# Patient Record
Sex: FEMALE | Race: WHITE
[De-identification: names, ages, dates, MRNs, and addresses within clinical notes are randomized per-mention and may not be internally consistent; named-entity substitution may affect disease eponyms.]

---

## 2019-08-28 ENCOUNTER — HOSPITAL ENCOUNTER (INPATIENT)
Dept: HOSPITAL 35 - SBH | Age: 79
LOS: 6 days | Discharge: HOME | DRG: 988 | End: 2019-09-03
Attending: PSYCHIATRY & NEUROLOGY | Admitting: PSYCHIATRY & NEUROLOGY
Payer: COMMERCIAL

## 2019-08-28 VITALS — SYSTOLIC BLOOD PRESSURE: 170 MMHG | DIASTOLIC BLOOD PRESSURE: 60 MMHG

## 2019-08-28 VITALS — SYSTOLIC BLOOD PRESSURE: 182 MMHG | DIASTOLIC BLOOD PRESSURE: 68 MMHG

## 2019-08-28 DIAGNOSIS — Z79.4: ICD-10-CM

## 2019-08-28 DIAGNOSIS — F02.81: ICD-10-CM

## 2019-08-28 DIAGNOSIS — F32.9: ICD-10-CM

## 2019-08-28 DIAGNOSIS — G30.9: Primary | ICD-10-CM

## 2019-08-28 DIAGNOSIS — I10: ICD-10-CM

## 2019-08-28 DIAGNOSIS — E11.9: ICD-10-CM

## 2019-08-28 DIAGNOSIS — Z91.5: ICD-10-CM

## 2019-08-28 DIAGNOSIS — N90.89: ICD-10-CM

## 2019-08-28 DIAGNOSIS — F19.10: ICD-10-CM

## 2019-08-28 DIAGNOSIS — F41.9: ICD-10-CM

## 2019-08-28 DIAGNOSIS — Z79.899: ICD-10-CM

## 2019-08-28 PROCEDURE — 10880: CPT

## 2019-08-28 NOTE — NUR
Admitted from [place] arrived by EMS at 1630, to room 524 B, it was reported
she would be a 96 hour hold, but she is calm and cooperative now, and signed
herself in voluntarily, so was not placed on hold by Dr. Hardwick, she is
alert and oriented x 4, very anxious and nearly hyperventilating, reassured
and had her take deep breaths and to speak with me, she did calm down and was
able to finish the interview. She stated she is diabetic and takes insulin
depending on her "sugar level" I did find other meds in her belonging
including antidepressants and antihypertensives, when asked she said "oh yes,
those aren't as important as my insulin", she stated today she was frustrated
and mad, because her son would not let her go to the [facility] to visit her
 , that proposed to her on this day. She said she made a
statement that she "just wanted to be dead with her " she stated she
didn't mean it she was just made and started walking to the [facility], when EMS
picked her up and took her to [place], she was very angry at [place], but is now
calm and cooperative. Dr. Hardwick here and visited, orders received and will
monitor for safety.

## 2019-08-28 NOTE — NUR
RECEIVED REPORT FROM OFFGOING DAY NURSE, ASSUMED CARE OF PATIENT @ 19:15.
SITTING IN COMMON ROOM SOCIALIZING WITH PEERS AND WATCHING BASEBALL ON TV.
REPORTS SON GOT OUT OF [facility] AND HE AND HIS GIRLFRIEND ARE STAYING WITH PATIENT
AT HER HOUSE.  BECAME TEARFUL WHEN REPORTING THAT SHE HOPED SON WOULD TAKE
CARE OF HER GARDEN WHILE SHE WAS IN THE HOSPITAL.  A&OX 3-4.  HRRR, LUNGS CTA,
ABD NORMOACTIVE.  PT SMELLS UNCLEAN, WHEN ASKED WHEN SHE LAST BATHED, SHE
BECAME TEARFUL AND DID NOT ANSWER.  FSBS 187, LISPRO 3 U S/S PROVIDED. TOOK PO
MEDS WHOLE WITH WATER.  WILL CONINTIUE TO MONITOR Q 12 MINUTES FOR PATIENT
SAFETY.

## 2019-08-28 NOTE — D
[facility]
[address]
[place]   [address]                     DISCHARGE SUMMARY             
_______________________________________________________________________________
 
Name:       YOLA BAKER      Room #:         524B-B      DIS IN  
M.R.#:      [id number]                       Account #:      [id number]  
Admission:  08/28/19    Attend Phys:    Nikunj Hardwick DO
Discharge:  09/03/19    Date of Birth:  [birth year]  
                                                          Report #: [id number]
                                                                    [id number]   
_______________________________________________________________________________
THIS REPORT FOR:   //name//                          
 
CC: Nikunj Hardwick
    FAM physician/PCP
 
DATE OF SERVICE:  09/03/2019
 
 
ATTENDING PHYSICIAN:  Nikunj Hardwick DO
 
MEDICAL CONSULTANTS:  Annetta Del Toro M.D., also consulting on this case is
Zain Shi M.D., Gynecology.
 
DISCHARGE DIAGNOSES:  Major neurocognitive disorder, likely due to Alzheimer's
disease with behavioral disturbance, improved.
 
ADDITIONAL DIAGNOSES:  Vulvar mass, highly suspicious for carcinoma; however,
histopathology is pending.  Other diagnoses are diabetes mellitus, on metformin,
sliding scale insulin; hypertension, on Coreg, Norvasc, lisinopril.  The patient
was suspicious, though did not admit to smoking tobacco.  EKG was done at time
of admission showed a QTC of 424, ventricular rate of 60, sinus rhythm.  Also
noted microbiology done, gram-positive cocci and many gram-negative rods,
aerobic and anaerobic cultures pending as is the histopathology as punch biopsy
was taken of the vulvar mass.  It should be noted greater than 45 minutes was
spent on discharge activity.
 
DISCHARGE MEDICATIONS:  Coreg 3.125 mg p.o. b.i.d. with meals, Depakote 
mg p.o. b.i.d., calcium carbonate 500 mg, ____ cyanocobalamin 1000 mcg oral
supplementation, cholecalciferol 5000 international units p.o. daily, melatonin
10 mg p.o. at bedtime, metformin 500 mg p.o. b.i.d. with meals, ____ 10 mg p.o.
daily for hypertension, lisinopril 2.5 mg p.o. daily for hypertension, aspirin
81 mg p.o. daily for cardioprotection and NPH home regimen daily p.r.n. for
diabetes mellitus.  This admission, we stopped her Aricept given for bradycardia
and the degree of her dementia.
 
[facility]  She will need to
be taken there for an intake.  Primary care follow up should be with the PCP. 
Her DPOA wanted her to leave today before final pathology has resulted. 
Discussed with Dr. Shi over the phone the next steps for the mass if it turns
to be cancer.  The patient will need a gynecological evaluation.  Dr. Shi
refers to ____ [facility] for that.
 
REASON FOR ADMISSION:  A 78-year-old female brought to the [facility] on a
96-hour hold from [facility].  Apparently, the patient was doing some
threatening things and refused to go to the hospital.
 
 
 
 
[address]   [address]                     DISCHARGE SUMMARY             
_______________________________________________________________________________
 
Name:       YOLA BAKER      Room #:         524B-B      DIS IN  
M.R.#:      [id number]                       Account #:      [id number]  
Admission:  08/28/19    Attend Phys:    Nikunj Hardwick DO
Discharge:  09/03/19    Date of Birth:  [birth year]  
                                                          Report #: [id number]
                                                                    [id number]   
_______________________________________________________________________________
HOSPITAL COURSE:  The patient was admitted to the Geriatric Psychiatry Unit. 
The patient scored ____ exact score, but I think it was in the arena of 6-8/30. 
We had a couple of family meetings ____ girlfriend the need for 24/7 supervision
and assistance was emphasized.  The sons want to take the patient kind of in a
shared fashion to the son that lives in [place] ____ and then the son who lives
near [facility].
 
LABORATORY DATA:  Significant lab work this admission; urinalysis showed 2+
protein, few amorphous urates, trace random glucose, blood sugars the day before
admission were in the 128-203 range, which is excellent control.  Syphilis
serology turned out negative.
 
IMAGING:  The patient had a head CT done on 08/30/2019, it was essentially
normal.  Surprisingly, no ____.
 
OBJECTIVE:
VITAL SIGNS:  Temperature 36.6, pulse 81, respirations 40, /71, O2 sat
96%.
GENERAL:  This is a well-developed, fairly nourished  female appearing
stated age.  Attention limited, concentration unknown.  Speech is normal rate. 
Thought process linear and goal directed.  Thought content is focused.  On her
day-to-day needs ____.  Memory not formally tested.  Insight limited.  Judgment
limited.  Fund of knowledge below average.  It should be noted family meeting
was had at the day of discharge.  I have asked the sons to keep her few more
days.  They want her to come home today.  Dr. Shi was okay with discharge from
the gynecologic standpoint.  Diagnosis of dementia was made of the patient as
well as brief review of the circumstances surrounding her ____ pathology.
 
Prognosis is poor.
 
 
 
 
 
 
 
 
 
 
 
 
 
 
 
                         
   By:                               
                   
D: 09/04/19 0025                           _____________________________________
T: 09/04/19 0156                           Nikunj Hardwick, DO          /nt

## 2019-08-29 VITALS — DIASTOLIC BLOOD PRESSURE: 67 MMHG | SYSTOLIC BLOOD PRESSURE: 170 MMHG

## 2019-08-29 VITALS — DIASTOLIC BLOOD PRESSURE: 60 MMHG | SYSTOLIC BLOOD PRESSURE: 170 MMHG

## 2019-08-29 VITALS — DIASTOLIC BLOOD PRESSURE: 52 MMHG | SYSTOLIC BLOOD PRESSURE: 155 MMHG

## 2019-08-29 LAB
ANION GAP SERPL CALC-SCNC: 8 MMOL/L (ref 7–16)
BUN SERPL-MCNC: 25 MG/DL (ref 7–18)
CALCIUM SERPL-MCNC: 8.8 MG/DL (ref 8.5–10.1)
CHLORIDE SERPL-SCNC: 105 MMOL/L (ref 98–107)
CO2 SERPL-SCNC: 26 MMOL/L (ref 21–32)
CREAT SERPL-MCNC: 1.4 MG/DL (ref 0.6–1)
GLUCOSE SERPL-MCNC: 157 MG/DL (ref 74–106)
POTASSIUM SERPL-SCNC: 4.2 MMOL/L (ref 3.5–5.1)
SODIUM SERPL-SCNC: 139 MMOL/L (ref 136–145)

## 2019-08-29 NOTE — EKG
[address]  [address]
Phone:  [phone]                    ELECTROCARDIOGRAM REPORT      
_______________________________________________________________________________
 
Name:       YOLA BAKER      Room #:         [facility]      ADM IN  
M.R.#:      [id number]     Account #:      [id number]  
Admission:  19    Attend Phys:    Nikunj Hardwick DO
Discharge:              Date of Birth:  [birth year]  
                                                          Report #: [id number]
   [id number]
_______________________________________________________________________________
THIS REPORT FOR:   //name//                          
 
                          [facility]
                                       
Test Date:    2019               Test Time:    19:49:05
Pat Name:     YOLA BAKER        Department:   
Patient ID:   [id number]            Room:         [facility]
Gender:       F                        Technician:   LIANNE DELCID
:          1940               Requested By: Nikunj Hardwick
Order Number: [id number] MD:   Holden Low
                                 Measurements
Intervals                              Axis          
Rate:         60                       P:            -5
GA:           169                      QRS:          -8
QRSD:         78                       T:            56
QT:           424                                    
QTc:          424                                    
                           Interpretive Statements
Sinus rhythm
Ventricular premature complex
No previous ECG available for comparison
 
Electronically Signed On 2019 8:37:48 CDT by Holden Low
[email or web address]
 
 
 
 
 
 
 
 
 
 
 
 
 
 
 
 
 
 
 
  <ELECTRONICALLY SIGNED>
   By: Holden Low MD        
  19
D: 19                           _____________________________________
T: 19                           Holden Low MD          /TIFFANIE

## 2019-08-29 NOTE — NUR
NEW ORDER FOR VISTARIL 25 MG PO X 1 FOR ANXIETY, IF CANNOT GIVE PO, THEN GIVE
IM.  MELATONIN 10 MG FOR SLEEP.
PROZAC 10 MG RESUME @ DAILY.

## 2019-08-29 NOTE — NUR
Care assumed of patient at 1900: Patient alert and oriented x4.  Patient
pleasant and cooperative.  Patient took HS medication without difficulty.  Ate
100% HS snack.  Patient anxious and restless at times.  Patient was able to
sit and watch part of the football game with peers for awhile.  Patient
interacting well with staff.  Patient reports that she lost her  and
her father within a 2 week span.  Reports that she lost her oldest son about 4
weeks ago due to an MI.  Reports that [facility] too [facility].  Patient does appear depressed.  Denies
SI/HI/AH/VH at this time.  Reports that she was overwhelmed when she made the
SI statements before admission.  No s/s of paranoia or delusional behaviors at
this time.  Denies pain or discomfort.  Patient able to retire to bed and has
been able to sleep without difficulty at this time.
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
## 2019-08-30 VITALS — DIASTOLIC BLOOD PRESSURE: 59 MMHG | SYSTOLIC BLOOD PRESSURE: 151 MMHG

## 2019-08-30 VITALS — SYSTOLIC BLOOD PRESSURE: 141 MMHG | DIASTOLIC BLOOD PRESSURE: 58 MMHG

## 2019-08-30 VITALS — SYSTOLIC BLOOD PRESSURE: 133 MMHG | DIASTOLIC BLOOD PRESSURE: 104 MMHG

## 2019-08-30 LAB
EST. AVERAGE GLUCOSE BLD GHB EST-MCNC: 143 MG/DL
GLYCOHEMOGLOBIN (HGB A1C): 6.6 % (ref 4.8–5.6)

## 2019-08-30 NOTE — NUR
SW attempt to contact Tulio and Erwin to schedule a family meeting. CARLOZ
contacted the family on [phone]. CARLOZ was unable to leave voicemail due to
phone message been full. SW will have weekend SW contact family.

## 2019-08-30 NOTE — NUR
ASSUMED CARE AT 0700 THIS MORNING.  PT. UP  ON THE UNIT SITTING AND TALKING TO
PEERS.  SHE HAS BEEN QUIET AND SUBDUED TODAY.  SHE INTERACTS UPON APPROACH
ONLY.  SHE TOOK HER MEDS WITHOUT PROBLEMS.  SHE ATE ON THE UNIT, AND ATTENDED
GROUPS.  SHE IS MOVING FROM PERSON TO PERSON INTERACTING WITH SOME PEERS.  SHE
HAS SPENT SOME TIME IN HER ROOM RESTING.  SHE IS DENYING SI/HI.  SHE HAD A CT
OF THE HEAD WITHOUT CONTRAST BECAUSE OF TREMORS.

## 2019-08-30 NOTE — NUR
SW attempted to contact pt cherelle Antunez to schedule a FM. SW was not able to find
the contact information in [employer] under pt summary. SW will follow-up with
pt.

## 2019-08-31 VITALS — DIASTOLIC BLOOD PRESSURE: 60 MMHG | SYSTOLIC BLOOD PRESSURE: 137 MMHG

## 2019-08-31 LAB
ALBUMIN SERPL-MCNC: 2.3 G/DL (ref 3.4–5)
ALT SERPL-CCNC: 14 U/L (ref 30–65)
AST SERPL-CCNC: 13 U/L (ref 15–37)
BILIRUB DIRECT SERPL-MCNC: < 0.1 MG/DL
BILIRUB SERPL-MCNC: 0.3 MG/DL
PROT SERPL-MCNC: 5.8 G/DL (ref 6.4–8.2)

## 2019-08-31 NOTE — NUR
0700: Report rec from [facility] shift, care assumed.
[id number]: Ambulatory independently in room, halls and to DR. Feeds self,
appetite fair, takes meds whole w/o difficulty. Attended 0900 therapy group,
75% participation noted. Cooperative with staff, mood is calm, occasional
anxiety noted but easily calmed with reassurance and support.
1600: Son Tulio here for visit, contact info given: Tulio Castro
[phone]. Family mtg scheduled for 09/01 @ 1100.

## 2019-08-31 NOTE — NUR
VSS, COOPERATED WITH ASSESSMENT, HRRR, LUNGS CTA, ABD NORMO X 4 Q.  REPORTS BM
TODAY.  DENIES PAIN.  SLEPT A TOTAL OF 8.4 HOURS.

## 2019-09-01 VITALS — DIASTOLIC BLOOD PRESSURE: 56 MMHG | SYSTOLIC BLOOD PRESSURE: 152 MMHG

## 2019-09-01 VITALS — DIASTOLIC BLOOD PRESSURE: 59 MMHG | SYSTOLIC BLOOD PRESSURE: 150 MMHG

## 2019-09-01 LAB
BILIRUB UR-MCNC: NEGATIVE MG/DL
COLOR UR: YELLOW
KETONES UR STRIP-MCNC: NEGATIVE MG/DL
RBC # UR STRIP: NEGATIVE /UL
RBC #/AREA URNS HPF: (no result) /HPF (ref 0–2)
SP GR UR STRIP: 1.02 (ref 1–1.03)
SQUAMOUS: (no result) /LPF (ref 0–3)
URINE CLARITY: CLEAR
URINE GLUCOSE-RANDOM*: (no result)
URINE LEUKOCYTES-REFLEX: NEGATIVE
URINE NITRITE-REFLEX: NEGATIVE
URINE PROTEIN (DIPSTICK): (no result)
URINE WBC-REFLEX: (no result) /HPF (ref 0–5)
UROBILINOGEN UR STRIP-ACNC: 0.2 E.U./DL (ref 0.2–1)
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
## 2019-09-01 NOTE — NUR
Care assumed of patient at 1915: Patient alert and oriented x4.  Patient calm
and cooperative.  Patient spoke about her oldest son passing away
approximately 4 weeks ago.  Patient appears sad and depressed.  Patient also
spoke about how she saw her son Tulio earlier today when he came to visit.
Reports that she is ready to return to her home.  Reports that her youngest
son lives with her to help her around the home and mow the grass.  Family
meeting is to be held tomorrow to discuss discharge plans.  Patient also
started to speak about her father and  passing away.  Patient appeared
to become anxious when discussing about them.  Patient also seemed to repeat
herself several times during our conversation.  Appears to be forgetful at
times.  Patient ate 100% HS snack.  Took HS medication without difficulty.
Denies pain or discomfort.  Patient observed sitting with other peers but
appears to be more of a passive participant when around others.  Patient
denies SI/HI/AH/VH.  No s/s of paranoia or delusions observed.  Patient was
able to retire to bed without difficulty and has been resting quietly since.
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
## 2019-09-01 NOTE — NUR
CARLOZ and Dr. Wiggins had a family meeting with Pt and son Tulio. Tulio is
is wanting Pt discharged as soon as possible.  Tulio was informed there will
need to be plan in place for 24 hour care for the Pt upon discharged.  DPOA
was also completed at this meeting.  next meeting scheduled for Tuesday 9/3/19
at 3pm.  Pt may be discharged on this date.

## 2019-09-02 VITALS — DIASTOLIC BLOOD PRESSURE: 87 MMHG | SYSTOLIC BLOOD PRESSURE: 155 MMHG

## 2019-09-02 VITALS — SYSTOLIC BLOOD PRESSURE: 147 MMHG | DIASTOLIC BLOOD PRESSURE: 78 MMHG

## 2019-09-02 PROCEDURE — 0UBMXZX EXCISION OF VULVA, EXTERNAL APPROACH, DIAGNOSTIC: ICD-10-PCS | Performed by: OBSTETRICS & GYNECOLOGY

## 2019-09-02 NOTE — NUR
PATIENT ABLE TO SPEAK WITH SON AND THIS NURSE REGARDING HER GRIEF. ABLE TO
DISCUSS HER LATE  AND SON, DESCRIBING THEIR HOBBIES & PERSONALITIES.
PATIENT SPOKE TO THIS NURSE ABOUT STAYING WITH HER SON JUDI AFTER DISCHARGE
AND WAS HAPPY WHEN DISCUSSING MAKING DINNER, GARDENING, & RIDING WITH HIM.
PATIENT HAS SOME INCREASED ANXIETY AND TEARFUL PRIOR TO PROCEDURE FOR VULVAR
BIOPSY PERFORMED TODAY BY DR. ROMERO. SHE WAS ABLE TO CALM HERSELF WHEN WE
DISCUSSED NOT YET KNOWING THE ANSWERS FROM PATHOLOGY REPORTS AND WAS ACCEPTING
WHEN DR. ROMERO VERBALIZED TO HER THAT SHE HAS A TUMOR AND WE ARE SENDING
SPECIMEN FOR TESTING WITH A POTENTIAL FOR CANCER. SHE WAS ABLE TO HANDLE THE
INFORMATION WELL. DENIES ANY THOUGHTS OF HARMING HERSELF THIS SHIFT. ABLE TO
VERBALIZE COPING SKILLS SHE CAN USE. SHE TOLD THIS NURSE ABOUT A GROUP OF
WOMEN SHE HAS GET TOGETHERS WITH CALLED THE "[facility]". SHE STATES MOST
OF THOSE WOMEN ARE WIDOWS AND SHE HAS GOOD GRIEF SUPPORT WITH THEM. DISCUSSED
POST DISCHARGE SUPPORT FOR GRIEF WITH THE PATIENT AND SON. PATIENT IS
AGREEABLE TO ADDITIONAL RESOURCES.

## 2019-09-02 NOTE — NUR
Care assumed of patient at 1915: Patient seated in day room at start of shift.
 Patient interacting well with staff and peers.  Patient alert and oriented
x4.  Patient calm, pleasant and cooperative.  Patient denies pain or
discomfort.  However, patient noted to be grimacing when she initially sits
down and when she uses the bathroom.  Estefany care completed and barrier cream
applied.  Foul odor noted and serous drainage observed.  Biopsy to be
completed to vulvular mass by gynecologist on 9/2/19.  Patient ate 100% HS
snack.  Took HS medication without difficulty.  Patient retired to bed without
difficulty and has been resting quietly since.

## 2019-09-03 VITALS — DIASTOLIC BLOOD PRESSURE: 71 MMHG | SYSTOLIC BLOOD PRESSURE: 143 MMHG

## 2019-09-03 VITALS — SYSTOLIC BLOOD PRESSURE: 155 MMHG | DIASTOLIC BLOOD PRESSURE: 87 MMHG

## 2019-09-03 VITALS — SYSTOLIC BLOOD PRESSURE: 143 MMHG | DIASTOLIC BLOOD PRESSURE: 71 MMHG

## 2019-09-03 NOTE — NUR
Patient Name: YOLA BAKER
Admission Date: 08/28/19
DISCHARGE PLAN: Pt will d/c home with her son's.
Care Assessment: Pt was assesed by Dr. Hardwick in diagnosed with Major
Neurocognitive Disorder.
Level II Assessment: None
Transportation: Pt will be transported by her son.
Special Instructions/Notes: Pt will need a continuance care with a
psychiatrist. SW recommended [facility]. Pt son daisy mention
that he schedule appointment tomorrow due to pt wanting to leave AMA.
 
DISCHARGE TO FACILITY:
Facility:    Phone:   Fax:
Address:
Contact Name:   Phone:
PCP: [facility]
Psychiatrist:

## 2019-09-03 NOTE — NUR
PT OUT WITH SELECTED PEERS, WATCHING TV AND INTERACTING APPROPRIATLY. SMILING
AND RELAXED. LOOKING FORWARD TO POSSIBLE DC IN THE AM. AFTER SNACKS, TOOK HS
MEDS AS PRESCRIBED. SLEPT WELL THROUGH THE NIGHT.

## 2019-09-03 NOTE — NUR
PT SITTING IN WITH OTHER PEERS. PT TEARFUL ABOUT HER LIPSTICK. PT STATED SHE
FEELS NAKED WITHOUT LIPSTICK AND EYE LINER. PT SEEMS TO HYPERVENTILATE AT
REST.

## 2019-09-03 NOTE — NUR
WENT OVER DISCHARGE INSTRUCTIONS WITH SON. VERBAL UNDERSTOOD D/C ORDERS. PT
LEFT VIA AMBULATION TO HOME.

## 2019-09-03 NOTE — NUR
FAMILY HERE FOR MEETING. PT GOING HOME WITH FAMILY TONIGHT. PATIENT WANTED TO
EAT DINNER FIRST BEFORE LEAVING.

## 2019-09-04 NOTE — PATH
[facility]
[address]
[place]   [address]                     PATHOLOGY RPT PROCEDURE       
_______________________________________________________________________________
 
Name:       YOLA CASTRO      Room #:         524B-B      DIS IN  
M.R.#:      [id number]     Account #:      [id number]  
Admission:  08/28/19    Date of Birth:  [birth year]  
Discharge:  09/03/19                                    Report #:    [id number]
                                                        Path Case #: [id number]
_______________________________________________________________________________
 
[facility] Accession Number: [id number]
.                                                                01
Material submitted:                                        .
vulva - RIGHT VULVAR MASS/TUMOR . Modifiers: right
.                                                                01
Clinical history:                                          .
SI, psychosis, aggressive/combative behavior
.                                                                02
**********************************************************************
Diagnosis:
Vulva, right vulvar mass (tumor specimen), biopsy:
- SUPERFICIAL FRAGMENTS SHOWING HIGH GRADE SQUAMOUS INTRAEPITHELIAL LESION
(VULVAR INTRAEPITHELIAL NEOPLASIA GRADE II-III).
LBQ/09/04/2019
**********************************************************************
.                                                                02
Comment:
Examination shows superficially sampled "tumor" wherein the epithelial
fragments show changes consistent with high grade squamous intraepithelial
lesion (LUDY II-III).  Desmoplastic stromal response is not definitively
identified; however, few foci are suspicious for an invasive squamous cell
carcinoma.  Much of the specimen has been tangentially sectioned limiting
further interpretation.
.
Dr. Keesha Castaneda has seen representative slides of this case and
concurs with the diagnosis rendered.
.
Findings of this case are telephoned to Dr. Rios at approximately
12:00 p.m. and later in the afternoon with Dr. Zain Shi on 9/4/19.
(IUV/db; 9/04/2019)
.                                                                02
Electronically signed:                                     .
Cherelle Gaffney MD, Pathologist
NPI- [id number]
.                                                                01
Gross description:                                         .
The specimen is received in formalin, labeled " Yola Castro, right
vulva ", is a 0.5 x 0.3 x 0.2 cm segment of tan-white, friable skin.  Also
received is a dark brown hemorrhagic tissue fragment measuring 0.4 x 0.2 x
0.1 cm.  The margins are inked black, and each bisected and entirely
submitted in A1. (SWS; 9/3/2019)
SHS/SHS
.                                                                02
Pathologist provided ICD-10:
N90.1
.                                                                02
CPT                                                        .
 
 
[address]   [address]                     PATHOLOGY RPT PROCEDURE       
_______________________________________________________________________________
 
Name:       YOLA CASTRO      Room #:         524B-B      DIS IN  
M.R.#:      [id number]     Account #:      [id number]  
Admission:  08/28/19    Date of Birth:  [birth year]  
Discharge:  09/03/19                                    Report #:    [id number]
                                                        Path Case #: [id number]
_______________________________________________________________________________
[id number]
Specimen Comment: A courtesy copy of this report has been sent to
Specimen Comment: [phone], [phone].
Specimen Comment: Report sent to  / DR RIOS
***Performed at:  01
   [address], [place]  [id number]
   MD Lamonte Gama MD Phone:  [phone]
***Performed at:  02
   [address]  [id number]
   MD Cherelle Gaffney MD Phone:  [phone]